# Patient Record
Sex: FEMALE | Race: WHITE | NOT HISPANIC OR LATINO | Employment: UNEMPLOYED | URBAN - METROPOLITAN AREA
[De-identification: names, ages, dates, MRNs, and addresses within clinical notes are randomized per-mention and may not be internally consistent; named-entity substitution may affect disease eponyms.]

---

## 2021-07-07 ENCOUNTER — LAB VISIT (OUTPATIENT)
Dept: LAB | Facility: HOSPITAL | Age: 56
End: 2021-07-07
Attending: INTERNAL MEDICINE
Payer: COMMERCIAL

## 2021-07-07 ENCOUNTER — OFFICE VISIT (OUTPATIENT)
Dept: GASTROENTEROLOGY | Facility: CLINIC | Age: 56
End: 2021-07-07
Payer: COMMERCIAL

## 2021-07-07 VITALS
HEART RATE: 96 BPM | BODY MASS INDEX: 37.31 KG/M2 | WEIGHT: 210.56 LBS | DIASTOLIC BLOOD PRESSURE: 78 MMHG | HEIGHT: 63 IN | SYSTOLIC BLOOD PRESSURE: 144 MMHG

## 2021-07-07 DIAGNOSIS — R10.13 EPIGASTRIC PAIN: Primary | ICD-10-CM

## 2021-07-07 DIAGNOSIS — R10.11 RUQ PAIN: ICD-10-CM

## 2021-07-07 DIAGNOSIS — R10.13 EPIGASTRIC PAIN: ICD-10-CM

## 2021-07-07 DIAGNOSIS — R14.0 ABDOMINAL DISTENTION: ICD-10-CM

## 2021-07-07 DIAGNOSIS — R10.12 LUQ PAIN: ICD-10-CM

## 2021-07-07 LAB
ALBUMIN SERPL BCP-MCNC: 4.1 G/DL (ref 3.5–5.2)
ALP SERPL-CCNC: 89 U/L (ref 55–135)
ALT SERPL W/O P-5'-P-CCNC: 40 U/L (ref 10–44)
ANION GAP SERPL CALC-SCNC: 12 MMOL/L (ref 8–16)
AST SERPL-CCNC: 26 U/L (ref 10–40)
BASOPHILS # BLD AUTO: 0.07 K/UL (ref 0–0.2)
BASOPHILS NFR BLD: 0.9 % (ref 0–1.9)
BILIRUB DIRECT SERPL-MCNC: 0.3 MG/DL (ref 0.1–0.3)
BILIRUB SERPL-MCNC: 0.8 MG/DL (ref 0.1–1)
BUN SERPL-MCNC: 9 MG/DL (ref 6–20)
C4 SERPL-MCNC: 30 MG/DL (ref 11–44)
CALCIUM SERPL-MCNC: 9.9 MG/DL (ref 8.7–10.5)
CHLORIDE SERPL-SCNC: 104 MMOL/L (ref 95–110)
CO2 SERPL-SCNC: 25 MMOL/L (ref 23–29)
CORTIS SERPL-MCNC: 11.1 UG/DL
CREAT SERPL-MCNC: 0.8 MG/DL (ref 0.5–1.4)
DIFFERENTIAL METHOD: ABNORMAL
EOSINOPHIL # BLD AUTO: 0.1 K/UL (ref 0–0.5)
EOSINOPHIL NFR BLD: 1.9 % (ref 0–8)
ERYTHROCYTE [DISTWIDTH] IN BLOOD BY AUTOMATED COUNT: 14.6 % (ref 11.5–14.5)
EST. GFR  (AFRICAN AMERICAN): >60 ML/MIN/1.73 M^2
EST. GFR  (NON AFRICAN AMERICAN): >60 ML/MIN/1.73 M^2
FERRITIN SERPL-MCNC: 55 NG/ML (ref 20–300)
GLUCOSE SERPL-MCNC: 94 MG/DL (ref 70–110)
HAV IGM SERPL QL IA: NEGATIVE
HBV CORE IGM SERPL QL IA: NEGATIVE
HBV SURFACE AG SERPL QL IA: NEGATIVE
HCT VFR BLD AUTO: 41.3 % (ref 37–48.5)
HCV AB SERPL QL IA: NEGATIVE
HGB BLD-MCNC: 13.1 G/DL (ref 12–16)
IGA SERPL-MCNC: 288 MG/DL (ref 40–350)
IMM GRANULOCYTES # BLD AUTO: 0.06 K/UL (ref 0–0.04)
IMM GRANULOCYTES NFR BLD AUTO: 0.8 % (ref 0–0.5)
IRON SERPL-MCNC: 65 UG/DL (ref 30–160)
LIPASE SERPL-CCNC: 26 U/L (ref 4–60)
LYMPHOCYTES # BLD AUTO: 1.8 K/UL (ref 1–4.8)
LYMPHOCYTES NFR BLD: 24.2 % (ref 18–48)
MCH RBC QN AUTO: 25.9 PG (ref 27–31)
MCHC RBC AUTO-ENTMCNC: 31.7 G/DL (ref 32–36)
MCV RBC AUTO: 82 FL (ref 82–98)
MONOCYTES # BLD AUTO: 0.5 K/UL (ref 0.3–1)
MONOCYTES NFR BLD: 6.3 % (ref 4–15)
NEUTROPHILS # BLD AUTO: 4.9 K/UL (ref 1.8–7.7)
NEUTROPHILS NFR BLD: 65.9 % (ref 38–73)
NRBC BLD-RTO: 0 /100 WBC
PLATELET # BLD AUTO: 392 K/UL (ref 150–450)
PMV BLD AUTO: 8.9 FL (ref 9.2–12.9)
POTASSIUM SERPL-SCNC: 3.9 MMOL/L (ref 3.5–5.1)
PROT SERPL-MCNC: 8.3 G/DL (ref 6–8.4)
RBC # BLD AUTO: 5.05 M/UL (ref 4–5.4)
SATURATED IRON: 12 % (ref 20–50)
SODIUM SERPL-SCNC: 141 MMOL/L (ref 136–145)
TOTAL IRON BINDING CAPACITY: 554 UG/DL (ref 250–450)
TRANSFERRIN SERPL-MCNC: 374 MG/DL (ref 200–375)
WBC # BLD AUTO: 7.44 K/UL (ref 3.9–12.7)

## 2021-07-07 PROCEDURE — 83540 ASSAY OF IRON: CPT | Performed by: INTERNAL MEDICINE

## 2021-07-07 PROCEDURE — 80048 BASIC METABOLIC PNL TOTAL CA: CPT | Performed by: INTERNAL MEDICINE

## 2021-07-07 PROCEDURE — 82533 TOTAL CORTISOL: CPT | Performed by: INTERNAL MEDICINE

## 2021-07-07 PROCEDURE — 83690 ASSAY OF LIPASE: CPT | Performed by: INTERNAL MEDICINE

## 2021-07-07 PROCEDURE — 80074 ACUTE HEPATITIS PANEL: CPT | Performed by: INTERNAL MEDICINE

## 2021-07-07 PROCEDURE — 99204 PR OFFICE/OUTPT VISIT, NEW, LEVL IV, 45-59 MIN: ICD-10-PCS | Mod: S$GLB,,, | Performed by: INTERNAL MEDICINE

## 2021-07-07 PROCEDURE — 86160 COMPLEMENT ANTIGEN: CPT | Performed by: INTERNAL MEDICINE

## 2021-07-07 PROCEDURE — 86161 COMPLEMENT/FUNCTION ACTIVITY: CPT | Performed by: INTERNAL MEDICINE

## 2021-07-07 PROCEDURE — 86160 COMPLEMENT ANTIGEN: CPT | Mod: 59 | Performed by: INTERNAL MEDICINE

## 2021-07-07 PROCEDURE — 36415 COLL VENOUS BLD VENIPUNCTURE: CPT | Performed by: INTERNAL MEDICINE

## 2021-07-07 PROCEDURE — 83516 IMMUNOASSAY NONANTIBODY: CPT | Performed by: INTERNAL MEDICINE

## 2021-07-07 PROCEDURE — 82728 ASSAY OF FERRITIN: CPT | Performed by: INTERNAL MEDICINE

## 2021-07-07 PROCEDURE — 99999 PR PBB SHADOW E&M-NEW PATIENT-LVL III: ICD-10-PCS | Mod: PBBFAC,,, | Performed by: INTERNAL MEDICINE

## 2021-07-07 PROCEDURE — 82784 ASSAY IGA/IGD/IGG/IGM EACH: CPT | Performed by: INTERNAL MEDICINE

## 2021-07-07 PROCEDURE — 99204 OFFICE O/P NEW MOD 45 MIN: CPT | Mod: S$GLB,,, | Performed by: INTERNAL MEDICINE

## 2021-07-07 PROCEDURE — 80076 HEPATIC FUNCTION PANEL: CPT | Performed by: INTERNAL MEDICINE

## 2021-07-07 PROCEDURE — 99999 PR PBB SHADOW E&M-NEW PATIENT-LVL III: CPT | Mod: PBBFAC,,, | Performed by: INTERNAL MEDICINE

## 2021-07-07 PROCEDURE — 85025 COMPLETE CBC W/AUTO DIFF WBC: CPT | Performed by: INTERNAL MEDICINE

## 2021-07-07 RX ORDER — IRBESARTAN 150 MG/1
TABLET ORAL
COMMUNITY
Start: 2021-03-20

## 2021-07-07 RX ORDER — IRBESARTAN 150 MG/1
150 TABLET ORAL DAILY
COMMUNITY
Start: 2021-06-19

## 2021-07-07 RX ORDER — DIPHENHYDRAMINE HCL 2 %
50 CREAM (GRAM) TOPICAL
Qty: 2 TABLET | Refills: 0 | Status: SHIPPED | OUTPATIENT
Start: 2021-07-07

## 2021-07-07 RX ORDER — PREDNISONE 50 MG/1
50 TABLET ORAL
Qty: 3 TABLET | Refills: 0 | Status: SHIPPED | OUTPATIENT
Start: 2021-07-07

## 2021-07-09 LAB
C1INH FUNCTIONAL/C1INH TOTAL MFR SERPL: 98 %
TTG IGA SER-ACNC: 5 UNITS

## 2021-07-11 DIAGNOSIS — E61.1 IRON DEFICIENCY: Primary | ICD-10-CM

## 2021-07-11 LAB — C1INH SERPL-MCNC: 37 MG/DL (ref 21–39)

## 2021-07-11 RX ORDER — FERROUS SULFATE 325(65) MG
325 TABLET ORAL EVERY 12 HOURS
Qty: 60 TABLET | Refills: 4 | Status: SHIPPED | OUTPATIENT
Start: 2021-07-11

## 2021-07-12 ENCOUNTER — HOSPITAL ENCOUNTER (OUTPATIENT)
Dept: RADIOLOGY | Facility: HOSPITAL | Age: 56
Discharge: HOME | End: 2021-07-12
Attending: INTERNAL MEDICINE
Payer: COMMERCIAL

## 2021-07-12 DIAGNOSIS — K76.9 LIVER LESION: Primary | ICD-10-CM

## 2021-07-12 DIAGNOSIS — R10.13 EPIGASTRIC PAIN: ICD-10-CM

## 2021-07-12 DIAGNOSIS — K76.0 FATTY LIVER: ICD-10-CM

## 2021-07-12 DIAGNOSIS — R10.11 RUQ PAIN: ICD-10-CM

## 2021-07-12 DIAGNOSIS — R10.12 LUQ PAIN: ICD-10-CM

## 2021-07-12 DIAGNOSIS — R14.0 ABDOMINAL DISTENTION: ICD-10-CM

## 2021-07-12 PROCEDURE — 74177 CT ABD & PELVIS W/CONTRAST: CPT | Mod: TC

## 2021-07-12 PROCEDURE — 74177 CT ABDOMEN PELVIS WITH CONTRAST: ICD-10-PCS | Mod: 26,,, | Performed by: RADIOLOGY

## 2021-07-12 PROCEDURE — 74177 CT ABD & PELVIS W/CONTRAST: CPT | Mod: 26,,, | Performed by: RADIOLOGY

## 2021-07-12 PROCEDURE — 25500020 PHARM REV CODE 255: Performed by: INTERNAL MEDICINE

## 2021-07-12 RX ADMIN — IOHEXOL 100 ML: 350 INJECTION, SOLUTION INTRAVENOUS at 12:07

## 2021-07-13 ENCOUNTER — TELEPHONE (OUTPATIENT)
Dept: GASTROENTEROLOGY | Facility: CLINIC | Age: 56
End: 2021-07-13

## 2021-07-15 ENCOUNTER — TELEPHONE (OUTPATIENT)
Dept: GASTROENTEROLOGY | Facility: CLINIC | Age: 56
End: 2021-07-15

## 2021-10-22 ENCOUNTER — TELEPHONE (OUTPATIENT)
Dept: GASTROENTEROLOGY | Facility: CLINIC | Age: 56
End: 2021-10-22

## 2021-10-25 ENCOUNTER — TELEPHONE (OUTPATIENT)
Dept: GASTROENTEROLOGY | Facility: CLINIC | Age: 56
End: 2021-10-25
Payer: COMMERCIAL

## 2021-10-26 ENCOUNTER — TELEPHONE (OUTPATIENT)
Dept: GASTROENTEROLOGY | Facility: CLINIC | Age: 56
End: 2021-10-26
Payer: COMMERCIAL

## 2023-05-17 ENCOUNTER — OFFICE VISIT (OUTPATIENT)
Dept: PSYCHIATRY | Facility: CLINIC | Age: 58
End: 2023-05-17
Payer: COMMERCIAL

## 2023-05-17 VITALS — HEART RATE: 90 BPM | SYSTOLIC BLOOD PRESSURE: 150 MMHG | DIASTOLIC BLOOD PRESSURE: 73 MMHG

## 2023-05-17 DIAGNOSIS — F41.1 GENERALIZED ANXIETY DISORDER: Primary | ICD-10-CM

## 2023-05-17 DIAGNOSIS — F33.1 MODERATE EPISODE OF RECURRENT MAJOR DEPRESSIVE DISORDER: ICD-10-CM

## 2023-05-17 PROCEDURE — 99204 OFFICE O/P NEW MOD 45 MIN: CPT | Mod: S$GLB,,,

## 2023-05-17 PROCEDURE — 99999 PR PBB SHADOW E&M-EST. PATIENT-LVL II: ICD-10-PCS | Mod: PBBFAC,,,

## 2023-05-17 PROCEDURE — 99999 PR PBB SHADOW E&M-EST. PATIENT-LVL II: CPT | Mod: PBBFAC,,,

## 2023-05-17 PROCEDURE — 99204 PR OFFICE/OUTPT VISIT, NEW, LEVL IV, 45-59 MIN: ICD-10-PCS | Mod: S$GLB,,,

## 2023-05-17 RX ORDER — ESCITALOPRAM OXALATE 10 MG/1
10 TABLET ORAL DAILY
Qty: 30 TABLET | Refills: 1 | Status: SHIPPED | OUTPATIENT
Start: 2023-05-17 | End: 2024-05-16

## 2023-05-17 NOTE — PROGRESS NOTES
Outpatient Psychiatry Initial Visit ([facility])    5/17/2023    Cassie Cardenas, a 57 y.o. female, presenting for initial evaluation visit. Met with patient and spouse.    Reason for Encounter: self-referral. Patient complains of: depression and anxiety    Current Medications:  Hydroxyzine 10 mg PO BID-QID PRN  Seroquel 25 mg PO nightly    History of Present Illness:   Cassie Cardenas is a 56 yo female who presents to the clinic with  complaining of extreme anxiety and depression. Patient states that her father passed away in 2015, and had some depression then but never severe. Patient states that about 7 months ago she had a conversation with a friend that went wrong,  patient states that this happened in October 2022. Patient states that she said something hurtful about her friend's niece and now she is paranoid. Patient states that she thinks that people are talking about her. Patient's  states that she constantly looking at the news and is worried about everything. Patient states that she has been to a clothing store multiple times and feels like the workers are talking about her. Patient thinks that everyone but her family knows about what happened with her friend. Denies thought broadcasting. Denies ashleigh or hypomania.  Patient has been on Hydroxyzine and Seroquel for about 2 months with little effect for anxiety, but helps with sleep when she does take it. Patient denies SI/HI/AVH.      Standardized Screenings tools:   PHQ9: 16 (moderately severe)  GERRI- 7: 14 (moderate)      Stressors: relationship with friend    History:     Past Psychiatric History:   Previous therapy: no  Previous psychiatric treatment and medication trials: no  Previous psychiatric hospitalizations: no  Previous diagnoses: no  Previous suicide attempts: no  History of violence: no  Suicidal Ideation: no  Auditory Hallucination: no  Visual Hallucination: no    Social History:  Housing: house in [place]   Lives with:    Marital status:   Children: 2  Education: high school diploma/GED  Employment: unemployed  Access to gun: no  Hx of abuse: no    Substance Abuse History:  Recreational drugs: no  Alcohol: no  Tobacco use: no     Family Hx  Sister- depression, anxiety    Neuro Hx  Seizure: no  Head trauma/TBI: no      Review Of Systems:     Medical Review Of Systems:  Pertinent positives noted in HPI    Psychiatric Review Of Systems:  Sleep: yes, uses Seroquel  Appetite changes: yes  Weight changes: yes  Energy: yes  Anhedonia yes  Somatic symptoms: no  Anxiety/panic: yes  Guilty/hopeless: yes  Self-injurious behavior/risky behavior: no  Any drugs: no  Alcohol: no       Current Evaluation:       Mental Status Evaluation:  Appearance:  unremarkable, age appropriate, casually dressed   Behavior:  friendly and cooperative   Speech:  no latency; no press   Mood:  anxious, sad   Affect:  sad, anxious   Thought Process:  normal and logical   Thought Content:  normal, no suicidality, no homicidality, delusions, or paranoia   Sensorium:  person, place, situation, time/date   Cognition:  grossly intact   Insight:  has awareness of illness   Judgment:  behavior is adequate to circumstances     Physical/Somatic Complaints   The patient lists: no physical complaints.    Constitutional  Vitals:  Most recent vital signs, dated less than 90 days prior to this appointment, were reviewed.   Vitals:    05/17/23 0855   BP: (!) 150/73   Pulse: 90        General:  unremarkable, age appropriate, casually dressed       Laboratory Data  No visits with results within 1 Month(s) from this visit.   Latest known visit with results is:   Lab Visit on 07/07/2021   Component Date Value Ref Range Status    Specimen UA 07/07/2021 Urine, Clean Catch   Final    Color, UA 07/07/2021 Yellow  Yellow, Straw, [place] Final    Appearance, UA 07/07/2021 Clear  Clear Final    pH, UA 07/07/2021 5.0  5.0 - 8.0 Final    Specific Gravity, UA 07/07/2021 1.010  1.005  - 1.030 Final    Protein, UA 07/07/2021 Negative  Negative Final    Glucose, UA 07/07/2021 Negative  Negative Final    Ketones, UA 07/07/2021 Negative  Negative Final    Bilirubin (UA) 07/07/2021 Negative  Negative Final    Occult Blood UA 07/07/2021 Negative  Negative Final    Nitrite, UA 07/07/2021 Negative  Negative Final    Leukocytes, UA 07/07/2021 Trace (A)  Negative Final    Urine Culture, Routine 07/07/2021 No significant growth   Final    WBC, UA 07/07/2021 2  0 - 5 /hpf Final    Bacteria 07/07/2021 Occasional  None-Occ /hpf Final    Squam Epithel, UA 07/07/2021 1  /hpf Final    Microscopic Comment 07/07/2021 SEE COMMENT   Final         Medications  Outpatient Encounter Medications as of 5/17/2023   Medication Sig Dispense Refill    BENADRYL ALLERGY 25 mg tablet Take 2 tablets (50 mg total) by mouth On call Procedure for Insomnia (Take 50mg one hour prior to CT scan). 2 tablet 0    EScitalopram oxalate (LEXAPRO) 10 MG tablet Take 1 tablet (10 mg total) by mouth once daily. 30 tablet 1    ferrous sulfate (FEOSOL) 325 mg (65 mg iron) Tab tablet Take 1 tablet (325 mg total) by mouth every 12 (twelve) hours. 60 tablet 4    irbesartan (AVAPRO) 150 MG tablet   See Instructions, 90 tab, 3, Substitution Allowed, TAKE 1 TABLET BY MOUTH DAILY, 90, Route to Pharmacy Electronically, [id number], [id number], Instructions Replace Required Details, , 12/14/20 14:17:00 CST,...      irbesartan (AVAPRO) 150 MG tablet Take 150 mg by mouth once daily.      predniSONE (DELTASONE) 50 MG Tab Take 1 tablet (50 mg total) by mouth On call Procedure (On call to CT scan). 3 tablet 0     No facility-administered encounter medications on file as of 5/17/2023.           Assessment - Diagnosis - Goals:     Impression: Cassie Cardenas, a 57 y.o. female, presenting for initial evaluation visit. Patient has no past psychiatric history. Patient seems to be overly anxious and depressed. Patient is  so anxious that she is having a bit of paranoia. Patient  is with her, and states that she is constantly looking at the news and TV worried about her family's wellbeing. Will start patient on Lexapro 10 mg PO daily.       ICD-10-CM ICD-9-CM    1. Generalized anxiety disorder  F41.1 300.02 EScitalopram oxalate (LEXAPRO) 10 MG tablet      2. Moderate episode of recurrent major depressive disorder  F33.1 296.32 EScitalopram oxalate (LEXAPRO) 10 MG tablet           Strengths and Liabilities: Strength: Patient accepts guidance/feedback, Strength: Patient is intelligent., Strength: Patient is motivated for change., Liability: Patient lacks coping skills.    Treatment Goals:    Anxiety: acquiring relapse prevention skills, reducing physical symptoms of anxiety, and reducing time spent worrying (<30 minutes/day)  Depression: acquiring relapse prevention skills, increasing energy, increasing interest in usual activities, increasing motivation, reducing excessive guilt, and reducing fatigue    Treatment Plan/Recommendations:   Medication Management: Continue current medications.  Start Lexapro 10 mg PO daily for anxiety and depression  Continue Hydroxyzine 10 mg PO PRN   Continue Seroquel 25 mg PO nightly   Discussed diagnosis, risks and benefits of proposed treatment above vs alternative treatments vs no treatment, and potential side effects of these treatments, and the inherent unpredicatbility of individual response to treatment.The patient expresses understanding and gives informed consent to pursue treatment at this time believing that the potential benefits outweight the potential risks. Patient has no other questions. Risks/adverse effects discussed at this time including but not limited to: GI side effects, sexual dysfunction, activation vs sedation, triggering of suicidal thoughts, and serotonin syndrome.  I discussed with the patient the risks of Extrapyramidal Side Effects (dystonia, akathisia,  parkinsonism), Metabolic syndrome (inlcuding Hyperglycemia, hyperlipidemia), Neuroleptic Malignant syndrome (fever, muscle rigidity, autonomic instability), Orthostatic hypotension, Tardive Dyskinesia with antipsychotic use.  Patient voices understanding and agreement with this plan  Encouraged patient to keep future appointments.  Instructed patient to call or message with questions  In the event of an emergency, including suicidal ideation, patient was advised to go to the emergency room      Return to Clinic: 6 weeks    Total time: 56 minutes (which included pts differential diagnosis and prognosis for psychiatric conditions, risks, benefits of treatments, instructions and adherence to treatment plan, risk reduction, reviewing current psychiatric medication regimen, medical problems and social stressors. In addtion to possible discussion with other healthcare provider/s)    Corrine Rosas PA-C